# Patient Record
Sex: MALE | Race: WHITE | NOT HISPANIC OR LATINO | Employment: FULL TIME | URBAN - METROPOLITAN AREA
[De-identification: names, ages, dates, MRNs, and addresses within clinical notes are randomized per-mention and may not be internally consistent; named-entity substitution may affect disease eponyms.]

---

## 2018-08-23 ENCOUNTER — HOSPITAL ENCOUNTER (EMERGENCY)
Facility: HOSPITAL | Age: 23
Discharge: HOME | End: 2018-08-23
Attending: FAMILY MEDICINE
Payer: COMMERCIAL

## 2018-08-23 VITALS
OXYGEN SATURATION: 98 % | RESPIRATION RATE: 15 BRPM | HEART RATE: 84 BPM | BODY MASS INDEX: 30.61 KG/M2 | TEMPERATURE: 98 F | WEIGHT: 195 LBS | SYSTOLIC BLOOD PRESSURE: 129 MMHG | HEIGHT: 67 IN | DIASTOLIC BLOOD PRESSURE: 80 MMHG

## 2018-08-23 DIAGNOSIS — N20.0 RENAL CALCULI: Primary | ICD-10-CM

## 2018-08-23 DIAGNOSIS — R10.9 ABDOMINAL PAIN: ICD-10-CM

## 2018-08-23 LAB
ALBUMIN SERPL BCP-MCNC: 4.6 G/DL
ALP SERPL-CCNC: 57 U/L
ALT SERPL W/O P-5'-P-CCNC: 40 U/L
ANION GAP SERPL CALC-SCNC: 11 MMOL/L
AST SERPL-CCNC: 28 U/L
BACTERIA #/AREA URNS AUTO: ABNORMAL /HPF
BASOPHILS # BLD AUTO: 0.03 K/UL
BASOPHILS NFR BLD: 0.4 %
BILIRUB SERPL-MCNC: 0.4 MG/DL
BILIRUB UR QL STRIP: NEGATIVE
BUN SERPL-MCNC: 11 MG/DL
CALCIUM SERPL-MCNC: 9.6 MG/DL
CHLORIDE SERPL-SCNC: 108 MMOL/L
CLARITY UR REFRACT.AUTO: CLEAR
CO2 SERPL-SCNC: 24 MMOL/L
COLOR UR AUTO: YELLOW
CREAT SERPL-MCNC: 1.11 MG/DL
DIFFERENTIAL METHOD: ABNORMAL
EOSINOPHIL # BLD AUTO: 0.2 K/UL
EOSINOPHIL NFR BLD: 2.1 %
ERYTHROCYTE [DISTWIDTH] IN BLOOD BY AUTOMATED COUNT: 14.7 %
EST. GFR  (AFRICAN AMERICAN): >60 ML/MIN/1.73 M^2
EST. GFR  (NON AFRICAN AMERICAN): >60 ML/MIN/1.73 M^2
GLUCOSE SERPL-MCNC: 116 MG/DL
GLUCOSE UR QL STRIP: NEGATIVE
HCT VFR BLD AUTO: 49.8 %
HGB BLD-MCNC: 17.2 G/DL
HGB UR QL STRIP: ABNORMAL
KETONES UR QL STRIP: NEGATIVE
LEUKOCYTE ESTERASE UR QL STRIP: ABNORMAL
LIPASE SERPL-CCNC: 114 U/L
LYMPHOCYTES # BLD AUTO: 3.3 K/UL
LYMPHOCYTES NFR BLD: 39.7 %
MCH RBC QN AUTO: 30.2 PG
MCHC RBC AUTO-ENTMCNC: 34.5 G/DL
MCV RBC AUTO: 87 FL
MICROSCOPIC COMMENT: ABNORMAL
MONOCYTES # BLD AUTO: 0.5 K/UL
MONOCYTES NFR BLD: 5.8 %
NEUTROPHILS # BLD AUTO: 4.3 K/UL
NEUTROPHILS NFR BLD: 51.8 %
NITRITE UR QL STRIP: NEGATIVE
PH UR STRIP: 5 [PH] (ref 5–8)
PLATELET # BLD AUTO: 201 K/UL
PMV BLD AUTO: 10.6 FL
POTASSIUM SERPL-SCNC: 3.8 MMOL/L
PROT SERPL-MCNC: 7.8 G/DL
PROT UR QL STRIP: NEGATIVE
RBC # BLD AUTO: 5.7 M/UL
RBC #/AREA URNS AUTO: 50 /HPF (ref 0–4)
SODIUM SERPL-SCNC: 143 MMOL/L
SP GR UR STRIP: 1.02 (ref 1–1.03)
SQUAMOUS #/AREA URNS AUTO: 1 /HPF
URN SPEC COLLECT METH UR: ABNORMAL
UROBILINOGEN UR STRIP-ACNC: NEGATIVE EU/DL
WBC # BLD AUTO: 8.27 K/UL
WBC #/AREA URNS AUTO: 2 /HPF (ref 0–5)

## 2018-08-23 PROCEDURE — 80053 COMPREHEN METABOLIC PANEL: CPT

## 2018-08-23 PROCEDURE — 63600175 PHARM REV CODE 636 W HCPCS: Performed by: FAMILY MEDICINE

## 2018-08-23 PROCEDURE — 96372 THER/PROPH/DIAG INJ SC/IM: CPT

## 2018-08-23 PROCEDURE — 99284 EMERGENCY DEPT VISIT MOD MDM: CPT | Mod: 25

## 2018-08-23 PROCEDURE — 85025 COMPLETE CBC W/AUTO DIFF WBC: CPT

## 2018-08-23 PROCEDURE — 81000 URINALYSIS NONAUTO W/SCOPE: CPT

## 2018-08-23 PROCEDURE — 83690 ASSAY OF LIPASE: CPT

## 2018-08-23 RX ORDER — OXYCODONE AND ACETAMINOPHEN 5; 325 MG/1; MG/1
1 TABLET ORAL EVERY 4 HOURS PRN
Qty: 18 TABLET | Refills: 0 | OUTPATIENT
Start: 2018-08-23 | End: 2020-08-01

## 2018-08-23 RX ORDER — TAMSULOSIN HYDROCHLORIDE 0.4 MG/1
0.4 CAPSULE ORAL DAILY
Qty: 10 CAPSULE | Refills: 0 | Status: SHIPPED | OUTPATIENT
Start: 2018-08-23 | End: 2019-08-23

## 2018-08-23 RX ORDER — KETOROLAC TROMETHAMINE 30 MG/ML
60 INJECTION, SOLUTION INTRAMUSCULAR; INTRAVENOUS
Status: COMPLETED | OUTPATIENT
Start: 2018-08-23 | End: 2018-08-23

## 2018-08-23 RX ADMIN — KETOROLAC TROMETHAMINE 60 MG: 30 INJECTION, SOLUTION INTRAMUSCULAR at 07:08

## 2018-08-23 NOTE — ED PROVIDER NOTES
Encounter Date: 8/23/2018       History     Chief Complaint   Patient presents with    Flank Pain     Pt with L sided flank pain that started today. Pt denies dysuria.     23-year-old male patient comes in with complaint of pain when 1st starting work to the left flank and the left lateral abdomen.  Patient otherwise has no fever and no chills or night sweats no nausea no vomiting no hematuria or dysuria.  Patient has no history of kidney stones no history of any type of intra-abdominal process.          Review of patient's allergies indicates:  No Known Allergies  History reviewed. No pertinent past medical history.  History reviewed. No pertinent surgical history.  History reviewed. No pertinent family history.  Social History     Tobacco Use    Smoking status: Current Every Day Smoker     Packs/day: 1.00    Smokeless tobacco: Never Used   Substance Use Topics    Alcohol use: Yes     Comment: OCC    Drug use: No     Review of Systems   Constitutional: Negative for fever.   HENT: Negative for sore throat.    Respiratory: Negative for shortness of breath.    Cardiovascular: Negative for chest pain.   Gastrointestinal: Negative for nausea.   Genitourinary: Positive for flank pain. Negative for dysuria.   Musculoskeletal: Negative for back pain.   Skin: Negative for rash.   Neurological: Negative for weakness.   Hematological: Does not bruise/bleed easily.   All other systems reviewed and are negative.      Physical Exam     Initial Vitals [08/23/18 0654]   BP Pulse Resp Temp SpO2   132/82 90 18 97.8 °F (36.6 °C) 99 %      MAP       --         Physical Exam    Nursing note and vitals reviewed.  Constitutional: He appears well-developed and well-nourished.   HENT:   Head: Normocephalic and atraumatic.   Eyes: Conjunctivae and EOM are normal. Pupils are equal, round, and reactive to light.   Neck: Normal range of motion. Neck supple.   Cardiovascular: Normal rate, regular rhythm and normal heart sounds.    Pulmonary/Chest: Breath sounds normal.   Abdominal: Soft. Bowel sounds are normal. There is tenderness.   Patient has left flank pain with tenderness to the left lateral abdomen no guarding no rebound   Musculoskeletal: Normal range of motion.   Neurological: He is alert. He has normal reflexes.         ED Course   Procedures  Labs Reviewed   URINALYSIS - Abnormal; Notable for the following components:       Result Value    Occult Blood UA 3+ (*)     Leukocytes, UA 1+ (*)     All other components within normal limits   CBC W/ AUTO DIFFERENTIAL - Abnormal; Notable for the following components:    RDW 14.7 (*)     All other components within normal limits   COMPREHENSIVE METABOLIC PANEL - Abnormal; Notable for the following components:    Glucose 116 (*)     All other components within normal limits   URINALYSIS MICROSCOPIC - Abnormal; Notable for the following components:    RBC, UA 50 (*)     Bacteria, UA Few (*)     All other components within normal limits   LIPASE          Imaging Results          CT Renal Stone Study ABD Pelvis WO (In process)                X-Ray Abdomen Flat And Erect (In process)                  Medical Decision Making:   Initial Assessment:   Patient in moderate distress and no other complains    Differential Diagnosis:   Appendicitis , constipation, diverticulitis, colitis, gastroenteritis                        Clinical Impression:   The primary encounter diagnosis was Renal calculi. A diagnosis of Abdominal pain was also pertinent to this visit.                             Kevin Concepcion MD  08/23/18 [id number]

## 2020-04-18 ENCOUNTER — HOSPITAL ENCOUNTER (EMERGENCY)
Facility: HOSPITAL | Age: 25
Discharge: HOME | End: 2020-04-18
Attending: EMERGENCY MEDICINE
Payer: COMMERCIAL

## 2020-04-18 VITALS
RESPIRATION RATE: 18 BRPM | OXYGEN SATURATION: 99 % | DIASTOLIC BLOOD PRESSURE: 73 MMHG | TEMPERATURE: 99 F | SYSTOLIC BLOOD PRESSURE: 115 MMHG | HEIGHT: 67 IN | HEART RATE: 72 BPM | BODY MASS INDEX: 28.25 KG/M2 | WEIGHT: 180 LBS

## 2020-04-18 DIAGNOSIS — K21.9 GASTROESOPHAGEAL REFLUX DISEASE, ESOPHAGITIS PRESENCE NOT SPECIFIED: Primary | ICD-10-CM

## 2020-04-18 DIAGNOSIS — R07.89 CHEST TIGHTNESS: ICD-10-CM

## 2020-04-18 DIAGNOSIS — S39.012A LUMBAR STRAIN, INITIAL ENCOUNTER: ICD-10-CM

## 2020-04-18 LAB
BILIRUB UR QL STRIP: NEGATIVE
CLARITY UR REFRACT.AUTO: CLEAR
COLOR UR AUTO: YELLOW
GLUCOSE UR QL STRIP: NEGATIVE
HGB UR QL STRIP: NEGATIVE
KETONES UR QL STRIP: NEGATIVE
LEUKOCYTE ESTERASE UR QL STRIP: NEGATIVE
NITRITE UR QL STRIP: NEGATIVE
PH UR STRIP: 7 [PH] (ref 5–8)
PROT UR QL STRIP: NEGATIVE
SP GR UR STRIP: 1.01 (ref 1–1.03)
TROPONIN I SERPL DL<=0.01 NG/ML-MCNC: <0.012 NG/ML (ref 0.01–0.03)
URN SPEC COLLECT METH UR: NORMAL
UROBILINOGEN UR STRIP-ACNC: NEGATIVE EU/DL

## 2020-04-18 PROCEDURE — 93010 ELECTROCARDIOGRAM REPORT: CPT | Mod: ,,, | Performed by: INTERNAL MEDICINE

## 2020-04-18 PROCEDURE — 81003 URINALYSIS AUTO W/O SCOPE: CPT | Mod: ER

## 2020-04-18 PROCEDURE — 93010 EKG 12-LEAD: ICD-10-PCS | Mod: ,,, | Performed by: INTERNAL MEDICINE

## 2020-04-18 PROCEDURE — 25000003 PHARM REV CODE 250: Mod: ER | Performed by: PHYSICIAN ASSISTANT

## 2020-04-18 PROCEDURE — 84484 ASSAY OF TROPONIN QUANT: CPT | Mod: ER

## 2020-04-18 PROCEDURE — 99285 EMERGENCY DEPT VISIT HI MDM: CPT | Mod: 25,ER

## 2020-04-18 PROCEDURE — 93005 ELECTROCARDIOGRAM TRACING: CPT | Mod: ER

## 2020-04-18 RX ORDER — ACETAMINOPHEN 500 MG
500 TABLET ORAL EVERY 6 HOURS PRN
Qty: 10 TABLET | Refills: 0 | OUTPATIENT
Start: 2020-04-18 | End: 2020-08-01

## 2020-04-18 RX ORDER — PANTOPRAZOLE SODIUM 20 MG/1
20 TABLET, DELAYED RELEASE ORAL DAILY
Qty: 30 TABLET | Refills: 0 | Status: SHIPPED | OUTPATIENT
Start: 2020-04-18 | End: 2021-07-30 | Stop reason: SDUPTHER

## 2020-04-18 RX ADMIN — LIDOCAINE HYDROCHLORIDE: 20 SOLUTION ORAL; TOPICAL at 04:04

## 2020-04-18 NOTE — ED PROVIDER NOTES
"Encounter Date: 4/18/2020       History     Chief Complaint   Patient presents with    Chest Pain     x 1 month with back pain, " My dr said it was heart burn, gave meds but not working."     24-year-old male presents to the emergency department for evaluation of 1 month history intermittent chest of low back pain.  He reports that the symptoms began gradually 1 month ago and intermittent since onset.  He states that he has mild midsternal chest pain and mild chest tightness currently.  He reports that it began this morning and has been intermittent since onset.  He denies any cough, shortness of breath, palpitations, headache, dizziness, neck pain, dizziness, or fever.  He also reports that he has had intermittent low back pain, which is his main complaint today.  He denies any dysuria, hematuria or flank pain.  He does report that he has had history of kidney stones several years ago, but reports that this is not feel like his previous kidney stones.  He denies any numbness, tingling, weakness or swelling to the lower extremities.  Denies any direct trauma to his back or any heavy lifting.  No treatment was attempted today prior to arrival.  He does report that he went to his primary care provider who informed him that this is likely GERD and prescribed omeprazole.  He reports that he has not been taking the omeprazole for more than a week, because he did not think it was working.        Review of patient's allergies indicates:  No Known Allergies  History reviewed. No pertinent past medical history.  History reviewed. No pertinent surgical history.  History reviewed. No pertinent family history.  Social History     Tobacco Use    Smoking status: Current Every Day Smoker     Packs/day: 1.00    Smokeless tobacco: Never Used   Substance Use Topics    Alcohol use: Yes     Comment: OCC    Drug use: No     Review of Systems   Constitutional: Negative for activity change, appetite change and fever.   HENT: Negative " for congestion, postnasal drip, rhinorrhea, sinus pain, sore throat and voice change.    Eyes: Negative for photophobia and visual disturbance.   Respiratory: Positive for chest tightness. Negative for cough, shortness of breath and wheezing.    Cardiovascular: Positive for chest pain. Negative for palpitations and leg swelling.   Gastrointestinal: Negative for abdominal pain, constipation, diarrhea, nausea and vomiting.   Genitourinary: Negative for decreased urine volume and dysuria.   Musculoskeletal: Negative for back pain, joint swelling and neck stiffness.   Skin: Negative for rash and wound.   Neurological: Negative for weakness.   Hematological: Does not bruise/bleed easily.       Physical Exam     Initial Vitals [04/18/20 1638]   BP Pulse Resp Temp SpO2   133/82 94 18 98.5 °F (36.9 °C) 100 %      MAP       --         Physical Exam    Nursing note and vitals reviewed.  Constitutional: He appears well-developed and well-nourished. He is not diaphoretic. No distress.   HENT:   Head: Normocephalic and atraumatic.   Right Ear: External ear normal.   Left Ear: External ear normal.   Mouth/Throat: Oropharynx is clear and moist. No oropharyngeal exudate.   Eyes: Conjunctivae and EOM are normal. Pupils are equal, round, and reactive to light.   Neck: Normal range of motion. Neck supple.   Cardiovascular: Normal rate, regular rhythm and normal heart sounds.   Pulmonary/Chest: Breath sounds normal. No respiratory distress. He has no wheezes. He has no rhonchi. He has no rales. He exhibits no tenderness.   Abdominal: Soft. Bowel sounds are normal. He exhibits no distension. There is no tenderness. There is no rebound.   Lymphadenopathy:     He has no cervical adenopathy.   Neurological: He is alert and oriented to person, place, and time.   Skin: Skin is warm and dry.   Psychiatric: He has a normal mood and affect.         ED Course   Procedures  Labs Reviewed - No data to display       Imaging Results    None           Medical Decision Making:   Initial Assessment:   24-year-old male presents to the emergency department for evaluation one-month history chest pain and intermittent back pain.  Physical exam reveals a nontoxic-appearing male in no acute distress.  Patient is afebrile vital signs within normal limits.  Neurological exam reveals an alert and oriented patient.  Neck is supple, no meningeal signs noted.  Lungs clear to auscultation bilaterally.  No respiratory distress or accessory muscle use noted.  Abdominal exam reveals soft abdomen, nontender to palpation.  No CVA tenderness noted.  Differential Diagnosis:   Chest x-ray ordered to assess possible pneumonia or consolidation  GERD  Costochondritis  Carefully considered but doubt serious etiology including acute renal calculi.  ED Management:  EKG reveals no acute ST changes.  Urinalysis reveals no evidence of urinary tract infection or hematuria.  Chest x-ray reveals no acute intrathoracic abnormality.  Patient was given GI cocktail with only mild relief symptoms.  Troponin 0.012.  Discussed these findings at length with the patient who verbalizes understanding and agreement course of treatment.  Advised the patient to avoid NSAID medications as this could be making his GERD worse.  Instructed patient to follow up with his primary care provider for re-evaluation and to return to the emergency department immediately for any new or worsening symptoms.  I discussed this patient at length with Dr. Noe who is in agreement course of treatment.                   ED Course as of Apr 18 1651   Sat Apr 18, 2020   1644 EKG:  Rate 78.  Normal sinus rhythm.  No STEMI.  No ectopy.    [RN]      ED Course User Index  [RN] Charlie Noe Jr., MD                Clinical Impression:       ICD-10-CM ICD-9-CM   1. Gastroesophageal reflux disease, esophagitis presence not specified K21.9 530.81   2. Chest tightness R07.89 786.59   3. Lumbar strain, initial encounter S39.012A 847.2                                 Sheri Hughes PA-C  04/18/20 182

## 2020-04-18 NOTE — DISCHARGE INSTRUCTIONS
Your chest x-ray, EKG blood work did not reveal any evidence of cardiac abnormality.  This is likely GERD.  You prescribed prescribed Protonix for symptom control.  You are instructed to follow-up with your primary care provider for re-evaluation and to return to the emergency department immediately any new or worsening symptoms.

## 2020-04-18 NOTE — ED NOTES
Patient ambulated to bathroom. Reports feels no effects from gi cocktail. Reports left flank pain

## 2020-05-11 ENCOUNTER — HOSPITAL ENCOUNTER (EMERGENCY)
Facility: HOSPITAL | Age: 25
Discharge: HOME | End: 2020-05-11
Attending: EMERGENCY MEDICINE
Payer: COMMERCIAL

## 2020-05-11 VITALS
TEMPERATURE: 100 F | HEIGHT: 67 IN | HEART RATE: 84 BPM | OXYGEN SATURATION: 100 % | DIASTOLIC BLOOD PRESSURE: 84 MMHG | RESPIRATION RATE: 22 BRPM | SYSTOLIC BLOOD PRESSURE: 139 MMHG | BODY MASS INDEX: 29.03 KG/M2 | WEIGHT: 185 LBS

## 2020-05-11 DIAGNOSIS — S29.011A CHEST WALL MUSCLE STRAIN, INITIAL ENCOUNTER: Primary | ICD-10-CM

## 2020-05-11 DIAGNOSIS — R07.89 TIGHTNESS IN CHEST: ICD-10-CM

## 2020-05-11 LAB — SARS-COV-2 RDRP RESP QL NAA+PROBE: NEGATIVE

## 2020-05-11 PROCEDURE — U0002 COVID-19 LAB TEST NON-CDC: HCPCS | Mod: ER

## 2020-05-11 PROCEDURE — 93010 EKG 12-LEAD: ICD-10-PCS | Mod: ,,, | Performed by: INTERNAL MEDICINE

## 2020-05-11 PROCEDURE — 25000003 PHARM REV CODE 250: Mod: ER | Performed by: EMERGENCY MEDICINE

## 2020-05-11 PROCEDURE — 93005 ELECTROCARDIOGRAM TRACING: CPT | Mod: ER

## 2020-05-11 PROCEDURE — 93010 ELECTROCARDIOGRAM REPORT: CPT | Mod: ,,, | Performed by: INTERNAL MEDICINE

## 2020-05-11 PROCEDURE — 99284 EMERGENCY DEPT VISIT MOD MDM: CPT | Mod: 25,ER

## 2020-05-11 RX ORDER — MAG HYDROX/ALUMINUM HYD/SIMETH 200-200-20
30 SUSPENSION, ORAL (FINAL DOSE FORM) ORAL ONCE
Status: COMPLETED | OUTPATIENT
Start: 2020-05-11 | End: 2020-05-11

## 2020-05-11 RX ORDER — DICYCLOMINE HYDROCHLORIDE 10 MG/1
10 CAPSULE ORAL ONCE
Status: COMPLETED | OUTPATIENT
Start: 2020-05-11 | End: 2020-05-11

## 2020-05-11 RX ORDER — LIDOCAINE HYDROCHLORIDE 20 MG/ML
10 SOLUTION OROPHARYNGEAL ONCE
Status: COMPLETED | OUTPATIENT
Start: 2020-05-11 | End: 2020-05-11

## 2020-05-11 RX ORDER — MELOXICAM 15 MG/1
15 TABLET ORAL DAILY
Qty: 14 TABLET | Refills: 0 | OUTPATIENT
Start: 2020-05-11 | End: 2020-08-01

## 2020-05-11 RX ORDER — MELOXICAM 15 MG/1
15 TABLET ORAL DAILY
Qty: 14 TABLET | Refills: 0 | Status: SHIPPED | OUTPATIENT
Start: 2020-05-11 | End: 2020-05-11 | Stop reason: SDUPTHER

## 2020-05-11 RX ADMIN — LIDOCAINE HYDROCHLORIDE 10 ML: 20 SOLUTION ORAL; TOPICAL at 05:05

## 2020-05-11 RX ADMIN — ALUMINUM HYDROXIDE, MAGNESIUM HYDROXIDE, AND SIMETHICONE 30 ML: 200; 200; 20 SUSPENSION ORAL at 05:05

## 2020-05-11 RX ADMIN — DICYCLOMINE HYDROCHLORIDE 10 MG: 10 CAPSULE ORAL at 05:05

## 2020-05-11 NOTE — ED PROVIDER NOTES
"Encounter Date: 5/11/2020       History     Chief Complaint   Patient presents with    chest pressure and burning     "On the way home while I was driving my chest started to burn again to my back and then I got a pressure and it lasted about 25-30min" denies SOB, or sweating, nauseated "at times"     Patient is a 24 year old male presenting with constant, moderate waxing and waning chest pain for over 2 weeks. He was seen in the ED 4/18/20 for the same symptoms and had a negative cardiac work-up and was prescribed omeprazole, but stopped taking it because there was no change in symptoms for 2 weeks. 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 He also reports that the day before the pain started he leaned up against a piece of machinery in the area it was painful. No shortness of breath, fever or cough.         Review of patient's allergies indicates:  No Known Allergies  No past medical history on file.  No past surgical history on file.  No family history on file.  Social History     Tobacco Use    Smoking status: Current Every Day Smoker     Packs/day: 1.00    Smokeless tobacco: Never Used   Substance Use Topics    Alcohol use: Yes     Comment: OCC    Drug use: No     Review of Systems   Constitutional: Negative for appetite change, chills, fatigue and fever.   HENT: Negative for congestion, ear pain, rhinorrhea, sinus pressure and sore throat.    Respiratory: Negative for cough, shortness of breath and wheezing.    Cardiovascular: Positive for chest pain. Negative for palpitations and leg swelling.   Gastrointestinal: Negative for abdominal pain, blood in stool, constipation, diarrhea, nausea and vomiting.   Genitourinary: Negative for dysuria, frequency and hematuria.   Musculoskeletal: Negative for back pain, neck pain and neck stiffness.   Skin: Negative for rash.   Neurological: Negative for dizziness, weakness, numbness and headaches.   All other systems reviewed and are " negative.      Physical Exam     Initial Vitals [05/11/20 1644]   BP Pulse Resp Temp SpO2   139/84 84 (!) 22 99.6 °F (37.6 °C) 100 %      MAP       --         Physical Exam    Nursing note and vitals reviewed.  Constitutional: He appears well-developed and well-nourished. He appears distressed.   HENT:   Head: Normocephalic and atraumatic.   Eyes: Conjunctivae and EOM are normal.   Neck: Normal range of motion. Neck supple.   Cardiovascular: Normal rate, regular rhythm, normal heart sounds and intact distal pulses.   Pulmonary/Chest: Breath sounds normal. No respiratory distress. He exhibits tenderness (anterior chest wall tender to palpation. ROM of bilateral upper extremities against resistance recreated pain exactly. ).   Abdominal: Soft. Bowel sounds are normal. There is no tenderness.   Lymphadenopathy:     He has no cervical adenopathy.   Neurological: He is alert and oriented to person, place, and time. He has normal strength. No sensory deficit.   Skin: Skin is warm and dry. No rash noted.   Psychiatric: He has a normal mood and affect. His behavior is normal. Judgment and thought content normal.         ED Course   Procedures  Labs Reviewed   SARS-COV-2 RNA AMPLIFICATION, QUAL    Narrative:     What symptom criteria does the patient meet?->Other (specify)  Please specify:->chest pain          Imaging Results          X-Ray Chest AP Portable (Final result)  Result time 05/11/20 18:27:47    Final result by Mario Cat MD (05/11/20 18:27:47)                 Impression:      No acute findings.      Electronically signed by: Mario Cat MD  Date:    05/11/2020  Time:    18:27             Narrative:    EXAMINATION:  XR CHEST AP PORTABLE    CLINICAL HISTORY:  Suspected Covid-19 Virus Infection;  coughing congestion    TECHNIQUE:  AP view of the chest was performed.    COMPARISON:  04/18/2020    FINDINGS:  The cardiac and mediastinal silhouettes appear within normal limits.   The lungs are clear bilaterally.  No  acute osseous findings demonstrated.                                 Medical Decision Making:   Clinical Tests:   Radiological Study: Ordered and Reviewed  Medical Tests: Ordered and Reviewed  Covid 19 negative. Chest xray normal. EKG normal. Chest pain is recreated with movement and palpation. Rx for NSAIDS and continue pepcid or omeprazole with the NSAIDS. Follow up with PCP. Return to the ED if worse in any way.                                  Clinical Impression:       ICD-10-CM ICD-9-CM   1. Chest wall muscle strain, initial encounter S29.011A 848.8   2. Tightness in chest R07.89 786.59         Disposition:   Disposition: Discharged     ED Disposition Condition    Discharge Stable        ED Prescriptions     Medication Sig Dispense Start Date End Date Auth. Provider    meloxicam (MOBIC) 15 MG tablet Take 1 tablet (15 mg total) by mouth once daily. 14 tablet 5/11/2020  KIARRA Valenzuela        Follow-up Information    None                                    KIARRA Valenzuela  05/11/20 4741

## 2020-08-01 ENCOUNTER — HOSPITAL ENCOUNTER (EMERGENCY)
Facility: HOSPITAL | Age: 25
Discharge: HOME | End: 2020-08-01
Attending: EMERGENCY MEDICINE
Payer: COMMERCIAL

## 2020-08-01 VITALS
RESPIRATION RATE: 18 BRPM | HEART RATE: 78 BPM | SYSTOLIC BLOOD PRESSURE: 134 MMHG | DIASTOLIC BLOOD PRESSURE: 85 MMHG | WEIGHT: 185 LBS | TEMPERATURE: 99 F | HEIGHT: 68 IN | OXYGEN SATURATION: 99 % | BODY MASS INDEX: 28.04 KG/M2

## 2020-08-01 DIAGNOSIS — R10.13 EPIGASTRIC PAIN: ICD-10-CM

## 2020-08-01 DIAGNOSIS — F10.10 ALCOHOL ABUSE: ICD-10-CM

## 2020-08-01 DIAGNOSIS — Z72.0 TOBACCO ABUSE: ICD-10-CM

## 2020-08-01 DIAGNOSIS — K62.5 RECTAL BLEEDING: Primary | ICD-10-CM

## 2020-08-01 LAB
ALBUMIN SERPL BCP-MCNC: 4.4 G/DL (ref 3.5–5.2)
ALP SERPL-CCNC: 53 U/L (ref 38–126)
ALT SERPL W/O P-5'-P-CCNC: 23 U/L (ref 10–44)
ANION GAP SERPL CALC-SCNC: 8 MMOL/L (ref 8–16)
AST SERPL-CCNC: 26 U/L (ref 15–46)
BASOPHILS # BLD AUTO: 0.05 K/UL (ref 0–0.2)
BASOPHILS NFR BLD: 0.6 % (ref 0–1.9)
BILIRUB SERPL-MCNC: 0.4 MG/DL (ref 0.1–1)
BUN SERPL-MCNC: 20 MG/DL (ref 2–20)
CALCIUM SERPL-MCNC: 9.6 MG/DL (ref 8.7–10.5)
CHLORIDE SERPL-SCNC: 107 MMOL/L (ref 95–110)
CO2 SERPL-SCNC: 25 MMOL/L (ref 23–29)
CREAT SERPL-MCNC: 1.04 MG/DL (ref 0.5–1.4)
CRP SERPL-MCNC: 0.14 MG/DL (ref 0–1)
DIFFERENTIAL METHOD: ABNORMAL
EOSINOPHIL # BLD AUTO: 0.2 K/UL (ref 0–0.5)
EOSINOPHIL NFR BLD: 2.3 % (ref 0–8)
ERYTHROCYTE [DISTWIDTH] IN BLOOD BY AUTOMATED COUNT: 14.4 % (ref 11.5–14.5)
EST. GFR  (AFRICAN AMERICAN): >60 ML/MIN/1.73 M^2
EST. GFR  (NON AFRICAN AMERICAN): >60 ML/MIN/1.73 M^2
GLUCOSE SERPL-MCNC: 113 MG/DL (ref 70–110)
HCT VFR BLD AUTO: 46.4 % (ref 40–54)
HGB BLD-MCNC: 16.1 G/DL (ref 14–18)
IMM GRANULOCYTES # BLD AUTO: 0.03 K/UL (ref 0–0.04)
IMM GRANULOCYTES NFR BLD AUTO: 0.4 % (ref 0–0.5)
LYMPHOCYTES # BLD AUTO: 2.4 K/UL (ref 1–4.8)
LYMPHOCYTES NFR BLD: 29 % (ref 18–48)
MCH RBC QN AUTO: 31.2 PG (ref 27–31)
MCHC RBC AUTO-ENTMCNC: 34.7 G/DL (ref 32–36)
MCV RBC AUTO: 90 FL (ref 82–98)
MONOCYTES # BLD AUTO: 0.5 K/UL (ref 0.3–1)
MONOCYTES NFR BLD: 6.3 % (ref 4–15)
NEUTROPHILS # BLD AUTO: 5.2 K/UL (ref 1.8–7.7)
NEUTROPHILS NFR BLD: 61.4 % (ref 38–73)
NRBC BLD-RTO: 0 /100 WBC
PLATELET # BLD AUTO: 165 K/UL (ref 150–350)
PMV BLD AUTO: 10.3 FL (ref 9.2–12.9)
POTASSIUM SERPL-SCNC: 3.8 MMOL/L (ref 3.5–5.1)
PROT SERPL-MCNC: 7.3 G/DL (ref 6–8.4)
RBC # BLD AUTO: 5.16 M/UL (ref 4.6–6.2)
SODIUM SERPL-SCNC: 140 MMOL/L (ref 136–145)
WBC # BLD AUTO: 8.39 K/UL (ref 3.9–12.7)

## 2020-08-01 PROCEDURE — 85025 COMPLETE CBC W/AUTO DIFF WBC: CPT | Mod: ER

## 2020-08-01 PROCEDURE — 85652 RBC SED RATE AUTOMATED: CPT

## 2020-08-01 PROCEDURE — 99283 EMERGENCY DEPT VISIT LOW MDM: CPT | Mod: ER

## 2020-08-01 PROCEDURE — 86140 C-REACTIVE PROTEIN: CPT | Mod: ER

## 2020-08-01 PROCEDURE — 80053 COMPREHEN METABOLIC PANEL: CPT | Mod: ER

## 2020-08-02 LAB — ERYTHROCYTE [SEDIMENTATION RATE] IN BLOOD BY WESTERGREN METHOD: 4 MM/HR (ref 0–10)

## 2020-08-02 NOTE — DISCHARGE INSTRUCTIONS
You were seen today for your abdominal cramping, and rectal bleeding.  You need to quit smoking and drinking alcohol for 2 weeks to see if this will improve your symptoms.  If this does not improve your symptoms you need to see a GI doctor who might need to do a colonoscopy.  It is possible this could be related to Crohn's disease ulcerative colitis or irritable bowel syndrome.

## 2020-08-02 NOTE — ED PROVIDER NOTES
Encounter Date: 8/1/2020       History     Chief Complaint   Patient presents with    Abdominal Pain     tonight pt had some mild abdominal pain and the urge to defecate. when he had his bowel movement there was bright red blood in toilet. denies any clots. recently seen here for abdominal pain and diagnosed wtih kidney stones. denies any nausea or fever.      This is a 25-year-old man who presents for evaluation of a single episode of rectal bleeding today.  He drinks alcohol every day smoke cigarettes every day notes that he has been having 5 months of crampy intermittent abdominal pain which extends from the base of the chest through the lower part of his abdomen.  He has been seen multiple times for issues related there too undergoing evaluation with a CT which seemed to show he might have some kidney stones, he was seen by urologist who encouraged him to use a magnesium but otherwise is uncertain why he has these issues and has continued to suffer.  He was told he might have reflux but does not believe that that is the actual issue because he has tried to take the medications for that and it did not really help.  No in his family has problems that he knows of, though he has never asked.        Review of patient's allergies indicates:  No Known Allergies  History reviewed. No pertinent past medical history.  History reviewed. No pertinent surgical history.  History reviewed. No pertinent family history.  Social History     Tobacco Use    Smoking status: Current Every Day Smoker     Packs/day: 1.00    Smokeless tobacco: Never Used   Substance Use Topics    Alcohol use: Yes     Comment: everyday during covid     Drug use: No     Review of Systems  Constitutional-no fever no chills  HEENT-no congestion, no ear pain, no nose bleed, no sinus pain,  Eyes-no discharge, no itching, no redness, no visual change  Respiratory-no apnea, no chest tightness, no choking, no cough, no shortness of breath, no  wheezing  Cardio-no chest pain  GI-no distention, positive abdominal pain, positive rectal bleeding  Endocrine-no cold intolerance, no heat intolerance  -no difficulty urinating, no dysuria, no flank pain,  MSK-no arthralgias, no joint swelling, no myalgias  Skin-no rashes  Allergy-no environmental allergy  Neurologic-no dizziness, no headache, no numbness, no seizure  Hematology-no swollen nodes  Behavioral-no confusion, no hallucinations, no nervousness  Physical Exam     Initial Vitals [08/01/20 2032]   BP Pulse Resp Temp SpO2   134/85 101 16 99.2 °F (37.3 °C) 100 %      MAP       --         Physical Exam  Constitutional: Well appearing, no distress.  Eyes: Conjunctivae normal.  ENT       Head: Normocephalic, atraumatic.       Nose: No congestion.       Mouth/Throat: Mucous membranes are moist.  Hematological/Lymphatic/Immunilogical: No cervical lymphadenopathy.  Cardiovascular: Normal rate, regular rhythm. Normal and symmetric distal pulses.  Respiratory: Normal respiratory effort. Breath sounds are normal.  Gastrointestinal: Soft, nontender.   Musculoskeletal: Normal range of motion in all extremities. No obvious deformities or swelling.  Neurologic: Alert, oriented. Normal speech and language. No gross focal neurologic deficits are appreciated.  Skin: Skin is warm, dry. No rash noted.  Psychiatric: Mood and affect are normal.   ED Course   Procedures  Labs Reviewed   CBC W/ AUTO DIFFERENTIAL - Abnormal; Notable for the following components:       Result Value    Mean Corpuscular Hemoglobin 31.2 (*)     All other components within normal limits   COMPREHENSIVE METABOLIC PANEL - Abnormal; Notable for the following components:    Glucose 113 (*)     All other components within normal limits   C-REACTIVE PROTEIN   SEDIMENTATION RATE          Imaging Results    None          Medical Decision Making:   Differential Diagnosis:   Diverticulitis, rectal bleeding, fissure, hemorrhoid, Crohn's disease, celiac disease,  irritable bowel syndrome,  Clinical Tests:   Lab Tests: Ordered and Reviewed  ED Management:  This young man is benign abdominal examination, he is hemodynamically stable, his symptoms are concerning for potential Crohn's disease or longstanding autoimmune issue related to his bowels.  I did discuss his need for close follow-up but also alcohol and tobacco abstinence as this confound his treatment and picture.                                 Clinical Impression:       ICD-10-CM ICD-9-CM   1. Rectal bleeding  K62.5 569.3   2. Abdominal pain, unspecified abdominal location  R10.9 789.00   3. Tobacco abuse  Z72.0 305.1   4. Alcohol abuse  F10.10 305.00                                Robert Barnett MD  08/02/20 0451

## 2021-07-30 ENCOUNTER — HOSPITAL ENCOUNTER (EMERGENCY)
Facility: HOSPITAL | Age: 26
Discharge: HOME | End: 2021-07-30
Attending: FAMILY MEDICINE
Payer: COMMERCIAL

## 2021-07-30 VITALS
HEART RATE: 100 BPM | OXYGEN SATURATION: 100 % | WEIGHT: 185 LBS | SYSTOLIC BLOOD PRESSURE: 133 MMHG | DIASTOLIC BLOOD PRESSURE: 79 MMHG | RESPIRATION RATE: 16 BRPM | BODY MASS INDEX: 28.13 KG/M2 | TEMPERATURE: 99 F

## 2021-07-30 DIAGNOSIS — K21.9 GASTROESOPHAGEAL REFLUX DISEASE, UNSPECIFIED WHETHER ESOPHAGITIS PRESENT: Primary | ICD-10-CM

## 2021-07-30 DIAGNOSIS — R07.9 CHEST PAIN: ICD-10-CM

## 2021-07-30 PROCEDURE — 99283 EMERGENCY DEPT VISIT LOW MDM: CPT | Mod: ER

## 2021-07-30 PROCEDURE — 93010 ELECTROCARDIOGRAM REPORT: CPT | Mod: ,,, | Performed by: INTERNAL MEDICINE

## 2021-07-30 PROCEDURE — 25000003 PHARM REV CODE 250: Mod: ER | Performed by: FAMILY MEDICINE

## 2021-07-30 PROCEDURE — 93010 EKG 12-LEAD: ICD-10-PCS | Mod: ,,, | Performed by: INTERNAL MEDICINE

## 2021-07-30 PROCEDURE — 93005 ELECTROCARDIOGRAM TRACING: CPT | Mod: ER

## 2021-07-30 RX ORDER — PANTOPRAZOLE SODIUM 20 MG/1
20 TABLET, DELAYED RELEASE ORAL DAILY
Qty: 30 TABLET | Refills: 2 | Status: SHIPPED | OUTPATIENT
Start: 2021-07-30

## 2021-07-30 RX ADMIN — LIDOCAINE HYDROCHLORIDE: 20 SOLUTION ORAL; TOPICAL at 07:07
